# Patient Record
Sex: MALE | Employment: OTHER | URBAN - METROPOLITAN AREA
[De-identification: names, ages, dates, MRNs, and addresses within clinical notes are randomized per-mention and may not be internally consistent; named-entity substitution may affect disease eponyms.]

---

## 2021-04-14 ENCOUNTER — APPOINTMENT (OUTPATIENT)
Dept: CT IMAGING | Facility: CLINIC | Age: 36
End: 2021-04-14
Attending: EMERGENCY MEDICINE
Payer: COMMERCIAL

## 2021-04-14 ENCOUNTER — HOSPITAL ENCOUNTER (EMERGENCY)
Facility: CLINIC | Age: 36
Discharge: HOME | End: 2021-04-15
Attending: EMERGENCY MEDICINE | Admitting: EMERGENCY MEDICINE
Payer: COMMERCIAL

## 2021-04-14 VITALS
SYSTOLIC BLOOD PRESSURE: 116 MMHG | TEMPERATURE: 98 F | HEART RATE: 70 BPM | RESPIRATION RATE: 18 BRPM | OXYGEN SATURATION: 99 % | DIASTOLIC BLOOD PRESSURE: 88 MMHG

## 2021-04-14 DIAGNOSIS — R10.84 ABDOMINAL PAIN, GENERALIZED: ICD-10-CM

## 2021-04-14 DIAGNOSIS — K92.1 HEMATOCHEZIA: ICD-10-CM

## 2021-04-14 LAB
ALBUMIN SERPL-MCNC: 4 G/DL (ref 3.4–5)
ALP SERPL-CCNC: 113 U/L (ref 40–150)
ALT SERPL W P-5'-P-CCNC: 59 U/L (ref 0–70)
ANION GAP SERPL CALCULATED.3IONS-SCNC: 1 MMOL/L (ref 3–14)
AST SERPL W P-5'-P-CCNC: 26 U/L (ref 0–45)
BASOPHILS # BLD AUTO: 0.1 10E9/L (ref 0–0.2)
BASOPHILS NFR BLD AUTO: 0.6 %
BILIRUB SERPL-MCNC: 0.2 MG/DL (ref 0.2–1.3)
BUN SERPL-MCNC: 16 MG/DL (ref 7–30)
CALCIUM SERPL-MCNC: 9.3 MG/DL (ref 8.5–10.1)
CHLORIDE SERPL-SCNC: 107 MMOL/L (ref 94–109)
CO2 SERPL-SCNC: 30 MMOL/L (ref 20–32)
CREAT SERPL-MCNC: 1.06 MG/DL (ref 0.66–1.25)
DIFFERENTIAL METHOD BLD: NORMAL
EOSINOPHIL # BLD AUTO: 0.3 10E9/L (ref 0–0.7)
EOSINOPHIL NFR BLD AUTO: 2.6 %
ERYTHROCYTE [DISTWIDTH] IN BLOOD BY AUTOMATED COUNT: 12.7 % (ref 10–15)
GFR SERPL CREATININE-BSD FRML MDRD: >90 ML/MIN/{1.73_M2}
GLUCOSE SERPL-MCNC: 92 MG/DL (ref 70–99)
HCT VFR BLD AUTO: 43.6 % (ref 40–53)
HGB BLD-MCNC: 14.5 G/DL (ref 13.3–17.7)
IMM GRANULOCYTES # BLD: 0 10E9/L (ref 0–0.4)
IMM GRANULOCYTES NFR BLD: 0.2 %
LACTATE BLD-SCNC: 1.4 MMOL/L (ref 0.7–2)
LIPASE SERPL-CCNC: 202 U/L (ref 73–393)
LYMPHOCYTES # BLD AUTO: 4.1 10E9/L (ref 0.8–5.3)
LYMPHOCYTES NFR BLD AUTO: 41 %
MCH RBC QN AUTO: 27.1 PG (ref 26.5–33)
MCHC RBC AUTO-ENTMCNC: 33.3 G/DL (ref 31.5–36.5)
MCV RBC AUTO: 81 FL (ref 78–100)
MONOCYTES # BLD AUTO: 0.8 10E9/L (ref 0–1.3)
MONOCYTES NFR BLD AUTO: 8.3 %
NEUTROPHILS # BLD AUTO: 4.7 10E9/L (ref 1.6–8.3)
NEUTROPHILS NFR BLD AUTO: 47.3 %
NRBC # BLD AUTO: 0 10*3/UL
NRBC BLD AUTO-RTO: 0 /100
PLATELET # BLD AUTO: 287 10E9/L (ref 150–450)
POTASSIUM SERPL-SCNC: 3.8 MMOL/L (ref 3.4–5.3)
PROT SERPL-MCNC: 7.7 G/DL (ref 6.8–8.8)
RBC # BLD AUTO: 5.36 10E12/L (ref 4.4–5.9)
SODIUM SERPL-SCNC: 138 MMOL/L (ref 133–144)
WBC # BLD AUTO: 9.9 10E9/L (ref 4–11)

## 2021-04-14 PROCEDURE — 85025 COMPLETE CBC W/AUTO DIFF WBC: CPT | Performed by: EMERGENCY MEDICINE

## 2021-04-14 PROCEDURE — 46600 DIAGNOSTIC ANOSCOPY SPX: CPT

## 2021-04-14 PROCEDURE — 83690 ASSAY OF LIPASE: CPT | Performed by: EMERGENCY MEDICINE

## 2021-04-14 PROCEDURE — 74177 CT ABD & PELVIS W/CONTRAST: CPT

## 2021-04-14 PROCEDURE — 80053 COMPREHEN METABOLIC PANEL: CPT | Performed by: EMERGENCY MEDICINE

## 2021-04-14 PROCEDURE — 83605 ASSAY OF LACTIC ACID: CPT | Performed by: EMERGENCY MEDICINE

## 2021-04-14 PROCEDURE — 250N000011 HC RX IP 250 OP 636: Performed by: EMERGENCY MEDICINE

## 2021-04-14 PROCEDURE — 99285 EMERGENCY DEPT VISIT HI MDM: CPT | Mod: 25

## 2021-04-14 RX ORDER — IOPAMIDOL 755 MG/ML
500 INJECTION, SOLUTION INTRAVASCULAR ONCE
Status: COMPLETED | OUTPATIENT
Start: 2021-04-14 | End: 2021-04-14

## 2021-04-14 RX ADMIN — IOPAMIDOL 100 ML: 755 INJECTION, SOLUTION INTRAVENOUS at 23:32

## 2021-04-14 ASSESSMENT — ENCOUNTER SYMPTOMS
CONSTIPATION: 1
ABDOMINAL PAIN: 1
BLOOD IN STOOL: 1

## 2021-04-15 NOTE — DISCHARGE INSTRUCTIONS
Please monitor symptoms closely.  Please follow-up with gastroenterology as discussed for consideration of further imaging for the cause of your rectal bleeding.    Return to ER if you develop worsened pain, or worsened bleeding.  Other signs of significant bleeding include dizziness, lightheadedness, shortness of breath    Discharge Instructions  Abdominal Pain    Abdominal pain (belly pain) can be caused by many things. Your evaluation today does not show the exact cause for your pain. Your provider today has decided that it is unlikely your pain is due to a life threatening problem, or a problem requiring surgery or hospital admission. Sometimes those problems cannot be found right away, so it is very important that you follow up as directed.  Sometimes only the changes which occur over time allow the cause of your pain to be found.    Generally, every Emergency Department visit should have a follow-up clinic visit with either a primary or a specialty clinic/provider. Please follow-up as instructed by your emergency provider today. With abdominal pain, we often recommend very close follow-up, such as the following day.    ADULTS:  Return to the Emergency Department right away if:    You get an oral temperature above 102oF or as directed by your provider.  You have blood in your stools. This may be bright red or appear as black, tarry stools.    You keep vomiting (throwing up) or cannot drink liquids.  You see blood when you vomit.   You cannot have a bowel movement or you cannot pass gas.  Your stomach gets bloated or bigger.  Your skin or the whites of your eyes look yellow.  You faint.  You have bloody, frequent or painful urination (peeing).  You have new symptoms or anything that worries you.    CHILDREN:  Return to the Emergency Department right away if your child has any of the above-listed symptoms or the following:    Pushes your hand away or screams/cries when his/her belly is touched.  You notice your  child is very fussy or weak.  Your child is very tired and is too tired to eat or drink.  Your child is dehydrated.  Signs of dehydration can be:  Significant change in the amount of wet diapers/urine.  Your infant or child starts to have dry mouth and lips, or no saliva (spit) or tears.    PREGNANT WOMEN:  Return to the Emergency Department right away if you have any of the above-listed symptoms or the following:    You have bleeding, leaking fluid or passing tissue from the vagina.  You have worse pain or cramping, or pain in your shoulder or back.  You have vomiting that will not stop.  You have a temperature of 100oF or more.  Your baby is not moving as much as usual.  You faint.  You get a bad headache with or without eye problems and abdominal pain.  You have a seizure.  You have unusual discharge from your vagina and abdominal pain.    Abdominal pain is pretty common during pregnancy.  Your pain may or may not be related to your pregnancy. You should follow-up closely with your OB provider so they can evaluate you and your baby.  Until you follow-up with your regular provider, do the following:     Avoid sex and do not put anything in your vagina.  Drink clear fluids.  Only take medications approved by your provider.    MORE INFORMATION:    Appendicitis:  A possible cause of abdominal pain in any person who still has their appendix is acute appendicitis. Appendicitis is often hard to diagnose.  Testing does not always rule out early appendicitis or other causes of abdominal pain. Close follow-up with your provider and re-evaluations may be needed to figure out the reason for your abdominal pain.    Follow-up:  It is very important that you make an appointment with your clinic and go to the appointment.  If you do not follow-up with your primary provider, it may result in missing an important development which could result in permanent injury or disability and/or lasting pain.  If there is any problem keeping  "your appointment, call your provider or return to the Emergency Department.    Medications:  Take your medications as directed by your provider today.  Before using over-the-counter medications, ask your provider and make sure to take the medications as directed.  If you have any questions about medications, ask your provider.    Diet:  Resume your normal diet as much as possible, but do not eat fried, fatty or spicy foods while you have pain.  Do not drink alcohol or have caffeine.  Do not smoke tobacco.    Probiotics: If you have been given an antibiotic, you may want to also take a probiotic pill or eat yogurt with live cultures. Probiotics have \"good bacteria\" to help your intestines stay healthy. Studies have shown that probiotics help prevent diarrhea (loose stools) and other intestine problems (including C. diff infection) when you take antibiotics. You can buy these without a prescription in the pharmacy section of the store.     If you were given a prescription for medicine here today, be sure to read all of the information (including the package insert) that comes with your prescription.  This will include important information about the medicine, its side effects, and any warnings that you need to know about.  The pharmacist who fills the prescription can provide more information and answer questions you may have about the medicine.  If you have questions or concerns that the pharmacist cannot address, please call or return to the Emergency Department.       Remember that you can always come back to the Emergency Department if you are not able to see your regular provider in the amount of time listed above, if you get any new symptoms, or if there is anything that worries you.    "

## 2021-04-15 NOTE — ED TRIAGE NOTES
Patient reports having upper abd pain on and off for several months. Has been constipated for the past week and hx hemorrhoid, noticed blood in stool

## 2021-04-15 NOTE — ED PROVIDER NOTES
History     Chief Complaint:  Abdominal pain      HPI   Deuce Parekh is a 35 year old male who presents for evaluation of abdominal pain and bloody stool. Patient presents primarily with concerns for blood in the stool for the past 2 weeks, as well as associated abdominal discomfort.  Patient initially noted bright red blood with wiping 2 weeks ago.  This has continued to remain present, now with blood that appears to be within the stool itself.  He does note his stools have been more hard, and while having a bowel movement recently, noted acute pain with passage of a bowel movement.  He wonders if he may have a hemorrhoid, as he has felt a lump to the perirectal area.  He also notes associated lower abdominal discomfort during the same time period.  On further questioning, he does acknowledge intermittent upper abdominal pain, located to the epigastric and right upper quadrant.  This has been present for the past 2 months, and is oftentimes exacerbated by consumption of spicy, greasy, or fatty meals.  He has no prior diagnosis of cholelithiasis.  He denies any previous abdominal surgeries.  Denies nausea, vomiting, fevers, or chills.  He denies any personal or known family history of inflammatory bowel disease.  He denies any known family history of premature colon malignancy.  He does acknowledge his father was recently diagnosed with kidney cancer, which has additionally caused concern to the patient.  No other complaints or concerns are voiced at this time.        Review of Systems   Gastrointestinal: Positive for abdominal pain (RUQ), blood in stool and constipation.   All other systems reviewed and are negative.      Allergies:  The patient has no known allergies.     Medications:  The patient is not on any medications.    Past Medical History:    The patient has no known medical problems.      Social History:  The patient presents alone.   PCP: Swathi Gallego     Physical Exam     Patient Vitals for  the past 24 hrs:   BP Temp Pulse Resp SpO2   04/14/21 2343 116/88 -- 70 -- 99 %   04/14/21 2137 (!) 129/96 -- -- -- --   04/14/21 2136 (!) 142/99 98  F (36.7  C) 82 18 100 %       Physical Exam  General:   Well-nourished   Speaking in full sentences  Eyes:   Conjunctiva without injection or scleral icterus  ENT:   Moist mucous membranes   Nares patent   Pinnae normal  Neck:   Full ROM   No stiffness appreciated  Resp:   Lungs CTAB   No crackles, wheezing or audible rubs   Good air movement  CV:    Normal rate, regular rhythm   S1 and S2 present   No murmur, gallop or rub  GI:   BS present   Abdomen soft without distention   Tenderness to palpation to RLQ and supra-pubic region   No guarding or rebound tenderness  :   (With nurse chaperone present)   Anoscopy noting non-thrombosed, non bleeding external hemorrhoid   No obvious anal fissure noted   Anoscopy limited by patient tolerance, though limited view without blood or visualized internal hemorrhoid  Skin:   Warm, dry, well perfused   No rashes or open wounds on exposed skin  MSK:   Moves all extremities   No focal deformities or swelling  Neuro:   Alert   Answers questions appropriately   Moves all extremities equally   Gait stable  Psych:   Normal affect, normal mood        Emergency Department Course     Imaging:    CT Abdomen Pelvis w Contrast   Pending     Laboratory:  Labs Ordered and Resulted from Time of ED Arrival Up to the Time of Departure from the ED   COMPREHENSIVE METABOLIC PANEL - Abnormal; Notable for the following components:       Result Value    Anion Gap 1 (*)     All other components within normal limits   CBC WITH PLATELETS DIFFERENTIAL   LIPASE   LACTIC ACID WHOLE BLOOD   FREE WATER         Emergency Department Course:    Reviewed:    I reviewed the patient's nursing notes, vitals, past medical records, Care Everywhere.     Assessments:    2205 I performed an exam of the patient as documented above.       Disposition:  Patient signed out  to my partner Dr. Tirado awaiting results of CT and disposition.    Impression & Plan      Medical Decision Making:  Deuce Parekh is a 35 year old male who presents to the emergency department today for evaluation of abdominal pain and rectal bleeding.  VS on presentation reveal elevated BP, though otherwise are unremarkable, and improved during patient's emergency department course.  A broad differential diagnosis was considered regarding the patient's presenting symptoms, including though was not limited to, anal fissure, external hemorrhoid, internal hemorrhoid, diverticulosis, AVM, malignancy, colitis, upper GI sources, appendicitis, coagulopathy, among others.  Evaluation as outlined above, demonstrates evidence of nonthrombosed, nonbleeding external hemorrhoids, though no anal fissure.  Anoscopy was performed, though limited by patient tolerance, and did not clearly identify source of bleeding.  In light of associated lower abdominal discomfort, we did decide to proceed with further advanced imaging to evaluate for alternative etiologies for his discomfort and associated bleeding such as colitis.  Here in the ER, initial laboratory evaluation reveals normal WBC count and normal lactate.  Ischemic etiologies seem less likely given the patient's young age, absence of vascular risk factors, as well as normal lactate.  Infectious causes also seem less likely in the absence of associated diarrhea, fever, or other constitutional symptoms.  He has no history of underlying coagulopathy, and hemoglobin/platelet count are within normal limits today.  He also presents in the context of postprandial epigastric/right upper quadrant discomfort.  I am highly suspicious this is unrelated to his lower abdominal pain/rectal bleeding, and is suspicious for peptic ulcer disease/gastritis versus cholelithiasis.  This will be further evaluated with advanced imaging.  At this time, patient symptoms are well controlled.  We  will plan for further evaluation with CT of the abdomen pelvis.  Patient is signed out to my partner on the overnight shift while awaiting results of the CT.  Should this return unremarkable and without pathology requiring emergent intervention, I do feel patient is stable for discharge from the ER with close gastroenterology follow-up.  We discussed that although the precise etiology for his bleeding may not be definitively identified following the ER evaluation, further evaluation with consideration for sigmoidoscopy versus colonoscopy may be helpful as an outpatient.  All of the patient's questions were answered prior to transfer of care.        Diagnosis:    ICD-10-CM    1. Abdominal pain, generalized  R10.84 GASTROENTEROLOGY ADULT REF CONSULT ONLY   2. Hematochezia  K92.1 GASTROENTEROLOGY ADULT REF CONSULT ONLY       Scribe Disclosure:  I, Jose Hunter, am serving as a scribe at 10:26 PM on 4/14/2021 to document services personally performed by Addison Escalante MD based on my observations and the provider's statements to me.             Addison Escalante MD  04/15/21 1117

## 2021-04-15 NOTE — ED PROVIDER NOTES
I received patient in signout from Dr. Escalante.  Please refer to their complete H&P for further information.  Briefly, patient presented with abdominal pain as well as bloody stools. Labs reassuring. At time of signout, CT abdomen pending. Should this be negative, plan for outpatient GI f/u.     CT Abdomen Pelvis w Contrast   Final Result   IMPRESSION:    1.  No inflammatory change, bowel obstruction or abscess. Appendix normal.        Labs Ordered and Resulted from Time of ED Arrival Up to the Time of Departure from the ED   COMPREHENSIVE METABOLIC PANEL - Abnormal; Notable for the following components:       Result Value    Anion Gap 1 (*)     All other components within normal limits   CBC WITH PLATELETS DIFFERENTIAL   LIPASE   LACTIC ACID WHOLE BLOOD   FREE WATER       I reevaluated the patient and discussed discharge. Ct reassuring.  Repeat abdominal exam benign. H/H stable.  Plan for outpatient GI f/u for likely colonoscopy  The patient is comfortable and in agreement with the plan.  Return for increasing pain, fever, intractble vomiting, bleeding >1pad/hour or should symptoms worsen.       Mimi Tirado, DO  04/15/21 0006